# Patient Record
(demographics unavailable — no encounter records)

---

## 2024-10-29 NOTE — DISCUSSION/SUMMARY
[FreeTextEntry1] : aggressive bp control.  refractory hypertension  continue coreg 25 q12/losartan 100/amlodipine 10 mg  hr 48 2023 and ? tired possible etiology as well continue coreg but possibly can change if severe symptoms in future.  continue hydralazine to 100 mg po tid.  continue atorvastatin 40 mg po qhs  JARDIANCE TOO EXPENSIVE. 2023  carotid: now LICA 70% as well, velocities similar: f/u dr. Delong  f/u in 4 months, bloodwork  pulmonary htn probably due to DHF asymptomatic with elevated pressures,  start spironolactone 50 mg po daily had dehydration in past so careful and call if symptoms. [EKG obtained to assist in diagnosis and management of assessed problem(s)] : EKG obtained to assist in diagnosis and management of assessed problem(s)

## 2024-10-29 NOTE — CARDIOLOGY SUMMARY
[de-identified] : sinus bradycardia cwith nsst changes at 48 bpm  [de-identified] : 3/20/23: \par  Severe stenosis of the bilateral proximal internal carotid arteries due to atherosclerotic calcification. 70% B/L  [de-identified] : 2/27/23\par  1. Right: proximal ICA >70% stenosis.\par  2. Left: proximal ICA 50-69% stenosis.\par  3. Subclavian arteries: no significant atherosclerosis bilaterally.\par  4. Vertebral flow is antigrade bilaterally.\par  5. Moderate atherosclerosis b/l carotids.

## 2024-10-29 NOTE — ASSESSMENT
[FreeTextEntry1] : 72 /o M with h/o HTN, SYNCOPE, DHF, B/L CAROTID STENOSIS 70% B/L ON CTA presents today for initial evaluation. Denies neurologic Sx. \par  \par  \par  3/24/23: \par  pt with b/l ica of 70% \par  CT: Severe stenosis of the bilateral proximal internal carotid arteries due to atherosclerotic calcification. (70%)\par  1/11/23: ldl 56 AND hdl 65  k; 5.1  gfr: 91  bnp: 135 \par  pt denies any cv complaints and denies sob though elevated pressures, pt walks 3 times a week for 2 miles. \par  \par  Plan:\par  - Continue monitoring carotid disease\par  - Aggressive lipid control / risk factor mod.

## 2024-10-29 NOTE — CARDIOLOGY SUMMARY
[de-identified] : sinus bradycardia cwith nsst changes at 48 bpm  [de-identified] : 3/20/23: \par  Severe stenosis of the bilateral proximal internal carotid arteries due to atherosclerotic calcification. 70% B/L  [de-identified] : 2/27/23\par  1. Right: proximal ICA >70% stenosis.\par  2. Left: proximal ICA 50-69% stenosis.\par  3. Subclavian arteries: no significant atherosclerosis bilaterally.\par  4. Vertebral flow is antigrade bilaterally.\par  5. Moderate atherosclerosis b/l carotids.

## 2024-10-29 NOTE — HISTORY OF PRESENT ILLNESS
[FreeTextEntry1] : pt with  HTN, SYNCOPE, DHF, B/L CAROTID STENOSIS 70% B/L ON CTA f/u with DR. ALICIA.  DD2, LVH, HIGH CO, BRANDON, Right mild JOSEFINA, Pulmonary HTN.   CT: Severe stenosis of the bilateral proximal internal carotid arteries due to atherosclerotic calcification. 23: ldl 56 AND hdl 65  k; 5.1  gfr: 91  bnp: 135    23:  70% b/l carotid sees dr. alicia And will f/u with ultrasound for now.  Patient denies cp and sob. Patient walks 2 miles 3 days a week. Patient denies dizziness or leg pain. Patient stopped taking chlorthalidone 2 months ago due to urinating too much.  T. HDL: 73, LDL: 55, APOB: 49, LPA: 23  23: pt had hydralazine started for elevated bp of 160/70 and chlorthalidone stopped.  pt bp still elevated and not sure if loss of appetite and tiredness at night is from medication or not.  pt has no other complaints.   23: Patient states he is having issues with Hydralazine 50mg such as loss of appetite and fatigue, patient educated to reduce to hydralazine 25mg at this time.  STOP LOSARTAN START VALSARTN 320, GET BW, AND RENAL U/S.   23: HGB: 12.1, T, HDL: 70, LDL: 46, BNP: 158 :  Echo lvef 65%, Dd2, E/e': 14, LVOT VTI: 38 cm LVMI:121 g/m2  Patient denies cp, sob, dizziness, or syncope. Patient c/o palpitations he describes as heart racing. Patient states they happen at night while he is lying in bed. Patient states palpitations happen about once per week and last for a couple of minutes.   10/6/23: PATIENT STATES JARDIANCE IS TOO EXPENSIVE $240/MONTH.   24: Patient started on Jardiance last visit BUT WAS TOO EXPENSIVE SO DID NOT START.  23: Less than 60% right renal artery stenosis. Left renal artery: normal.  bloodwork not done.  pt has bad appetite intermittently, pt denies cp, pt with intermittent sob if exerts himself too much otherwise no isssues.   6/24/24: 3/15/24: LDL: 44, T, HDL: 72, BNP: 180 bun/cr: 16/0.96 gfr: 83  24: Right: proximal ICA >70% stenosis. Left: proximal ICA >70% stenosis. Vertebral arteries: antegrade flow bilaterally. Subclavian arteries: no significant atherosclerosis bilaterally. Compared to the carotid ultrasound performed on 2023, Left ICA is >70% stenosis compared to 60% in past. Moderate calcified atherosclerosis b/l bifurcation. pt able to walk 2 miles without leg pain 3 times/week.  appreciate dr. alicia note getting CTA abdomen/pelvis to r/o renal artery stenosis.   10/11/24: 24: BNP: 194, HDL: 71, T, LDL :39 on atorvastatin 40 mg po qhs  : Echo: lvef 64%, LVMI: 143 g/m2, TR sosa: 3.75 m/s, GLS: -25.4%, E/e': 13, L wave on mitral valve inflow. lvot vti: 37.6 cm, CO: 8.9 l/min, PASP: 64 mmHg. severe LAE, mod NERIS, PASP: 64 mmHg, mod LVH increasing, Ao: 3.9 cm.  pt says feels well, walking 3 times/week 2 miles with no problems.  EKG: nsr with LVH.  bp mildly elevated.

## 2024-10-29 NOTE — PHYSICAL EXAM
[Carotid Bruit] : carotid bruit [Normal S1, S2] : normal S1, S2 [No Murmur] : no murmur [Clear Lung Fields] : clear lung fields [General Appearance - Well Developed] : well developed [Normal Conjunctiva] : the conjunctiva exhibited no abnormalities [Normal Oral Mucosa] : normal oral mucosa [Normal Jugular Venous V Waves Present] : normal jugular venous V waves present [] : no respiratory distress [Heart Rate And Rhythm] : heart rate and rhythm were normal [Heart Sounds] : normal S1 and S2 [Bowel Sounds] : normal bowel sounds [Abnormal Walk] : normal gait [Nail Clubbing] : no clubbing of the fingernails [Skin Color & Pigmentation] : normal skin color and pigmentation [Oriented To Time, Place, And Person] : oriented to person, place, and time [de-identified] : b/l

## 2024-10-29 NOTE — PHYSICAL EXAM
[Carotid Bruit] : carotid bruit [Normal S1, S2] : normal S1, S2 [No Murmur] : no murmur [Clear Lung Fields] : clear lung fields [General Appearance - Well Developed] : well developed [Normal Conjunctiva] : the conjunctiva exhibited no abnormalities [Normal Oral Mucosa] : normal oral mucosa [Normal Jugular Venous V Waves Present] : normal jugular venous V waves present [] : no respiratory distress [Heart Rate And Rhythm] : heart rate and rhythm were normal [Heart Sounds] : normal S1 and S2 [Bowel Sounds] : normal bowel sounds [Abnormal Walk] : normal gait [Nail Clubbing] : no clubbing of the fingernails [Skin Color & Pigmentation] : normal skin color and pigmentation [Oriented To Time, Place, And Person] : oriented to person, place, and time [de-identified] : b/l

## 2024-11-13 NOTE — PHYSICAL EXAM
[General Appearance - Well Developed] : well developed [Normal Appearance] : normal appearance [General Appearance - Well Nourished] : well nourished [Respiration, Rhythm And Depth] : normal respiratory rhythm and effort [Auscultation Breath Sounds / Voice Sounds] : lungs were clear to auscultation bilaterally [Chest Palpation] : palpation of the chest revealed no abnormalities [Lungs Percussion] : the lungs were normal to percussion [Heart Sounds] : normal S1 and S2 [Arterial Pulses Normal] : the arterial pulses were normal [Abdomen Tenderness] : non-tender [Abdomen Hernia] : no hernia was discovered [Abnormal Walk] : normal gait [Skin Turgor] : normal skin turgor [] : no rash [Oriented To Time, Place, And Person] : oriented to person, place, and time [Impaired Insight] : insight and judgment were intact [Affect] : the affect was normal [Memory Recent] : recent memory was not impaired

## 2024-11-13 NOTE — REVIEW OF SYSTEMS
[Fever] : no fever [Headache] : no headache [Chills] : no chills [Blurry Vision] : no blurred vision [Earache] : no earache [Discharge From Ears] : no discharge from the ears [Sore Throat] : no sore throat [SOB] : no shortness of breath [Dyspnea on exertion] : not dyspnea during exertion [Leg Claudication] : no intermittent leg claudication [Palpitations] : no palpitations [Cough] : no cough [Wheezing] : no wheezing [Abdominal Pain] : no abdominal pain [Nausea] : no nausea [Change in Appetite] : no change in appetite [Change In The Stool] : no change in stool [Constipation] : no constipation [Urinary Frequency] : no change in urinary frequency [Erectile Dysfunction] : no erectile dysfunction [Joint Pain] : no joint pain [Dizziness] : no dizziness [Convulsions] : no convulsions [Confusion] : no confusion was observed [Memory Lapses Or Loss] : no memory lapses or loss [Under Stress] : not under stress

## 2024-11-13 NOTE — HISTORY OF PRESENT ILLNESS
[FreeTextEntry1] : 72 YO M with  HTN, well controlled today on 3 meds.  Syncope, no repeated episodes.  HFpEF, Asymptomatic. Stable.  DLD-LDL 46.  B/L CAROTID STENOSIS (70% B/L ON CTA) presents for a routine follow up.  States he is able to walk 2 miles without any LE pain. No complaints today.  The patient denies CP, bleeding, SOB at rest, PND, abdominal discomfort, LH/dizziness, BLE edema, palpitations, and syncope.  ===DATA REVIEWED TODAY===   Renal artery duplex: Difficult study with no clear visualization of renal arteries   Carotid duplex: CHAVEZ 294/38 LICA 247/34  23: HGB: 12.1, T, HDL: 70, LDL: 46, BNP: 158 : Echo lvef 65%, GIIDD, E/e': 14, LVOT VTI: 38 cm LVMI:121 g/m2  ===OLD DATA=== 3/24/23: pt with b/l ica of 70% CT: Severe stenosis of the bilateral proximal internal carotid arteries due to atherosclerotic calcification. 24: T, HDL: 71, LDL: 39, BNP: 194,  24: Bilateral renal artery ostial calcification but no significant luminal stenosis. Cholelithiasis.

## 2024-11-13 NOTE — HISTORY OF PRESENT ILLNESS
[FreeTextEntry1] : 74 YO M with  HTN, well controlled today on 3 meds.  Syncope, no repeated episodes.  HFpEF, Asymptomatic. Stable.  DLD-LDL 46.  B/L CAROTID STENOSIS (70% B/L ON CTA) presents for a routine follow up.  States he is able to walk 2 miles without any LE pain. No complaints today.  The patient denies CP, bleeding, SOB at rest, PND, abdominal discomfort, LH/dizziness, BLE edema, palpitations, and syncope.  ===DATA REVIEWED TODAY===   Renal artery duplex: Difficult study with no clear visualization of renal arteries   Carotid duplex: CHAVEZ 294/38 LICA 247/34  23: HGB: 12.1, T, HDL: 70, LDL: 46, BNP: 158 : Echo lvef 65%, GIIDD, E/e': 14, LVOT VTI: 38 cm LVMI:121 g/m2  ===OLD DATA=== 3/24/23: pt with b/l ica of 70% CT: Severe stenosis of the bilateral proximal internal carotid arteries due to atherosclerotic calcification. 24: T, HDL: 71, LDL: 39, BNP: 194,  24: Bilateral renal artery ostial calcification but no significant luminal stenosis. Cholelithiasis.

## 2024-11-13 NOTE — ASSESSMENT
[FreeTextEntry1] : 74 YO M with  HTN, well controlled today on 3 meds. Still evaluating presence of renovascular disease given med requirements.  Syncope, no repeated episodes.  HFpEF, Asymptomatic. Stable.  DLD-LDL 46.  B/L CAROTID STENOSIS (70% B/L ON CTA)  States he is able to walk 2 miles without any LE pain. No complaints today.   Plan  - Rule out secondary causes of HTN: MORENO send for sleep study - Get secondary HTN labs: renin/aldosterone - Start HCTZ  - Chest X-ray PA/LAT - See Dr. Herrmann within next 2 weeks  - Send to Dr. Espinal after Dr. Herrmann for renal denervation then will f/u with Dr. Delong  - Continue Lipitor 40 daily

## 2024-11-26 NOTE — HISTORY OF PRESENT ILLNESS
[FreeTextEntry1] : pt with  HTN, SYNCOPE, DHF, B/L CAROTID STENOSIS 70% B/L ON CTA f/u with DR. ALICIA.  DD2, LVH, HIGH CO, BRANDON, Right mild JOSEFINA, Pulmonary HTN.   CT: Severe stenosis of the bilateral proximal internal carotid arteries due to atherosclerotic calcification. 23: ldl 56 AND hdl 65  k; 5.1  gfr: 91  bnp: 135    23:  70% b/l carotid sees dr. alicia And will f/u with ultrasound for now.  Patient denies cp and sob. Patient walks 2 miles 3 days a week. Patient denies dizziness or leg pain. Patient stopped taking chlorthalidone 2 months ago due to urinating too much.  T. HDL: 73, LDL: 55, APOB: 49, LPA: 23  23: pt had hydralazine started for elevated bp of 160/70 and chlorthalidone stopped.  pt bp still elevated and not sure if loss of appetite and tiredness at night is from medication or not.  pt has no other complaints.   23: Patient states he is having issues with Hydralazine 50mg such as loss of appetite and fatigue, patient educated to reduce to hydralazine 25mg at this time.  STOP LOSARTAN START VALSARTAN 320, GET BW, AND RENAL U/S.   23: HGB: 12.1, T, HDL: 70, LDL: 46, BNP: 158 :  Echo lvef 65%, Dd2, E/e': 14, LVOT VTI: 38 cm LVMI:121 g/m2  Patient denies cp, sob, dizziness, or syncope. Patient c/o palpitations he describes as heart racing. Patient states they happen at night while he is lying in bed. Patient states palpitations happen about once per week and last for a couple of minutes.   10/6/23: PATIENT STATES JARDIANCE IS TOO EXPENSIVE $240/MONTH.   24: Patient started on Jardiance last visit BUT WAS TOO EXPENSIVE SO DID NOT START.  23: Less than 60% right renal artery stenosis. Left renal artery: normal.  bloodwork not done.  pt has bad appetite intermittently, pt denies cp, pt with intermittent sob if exerts himself too much otherwise no issues.   24: 3/15/24: LDL: 44, T, HDL: 72, BNP: 180 bun/cr: 16/0.96 gfr: 83  24: Right: proximal ICA >70% stenosis. Left: proximal ICA >70% stenosis. Vertebral arteries: antegrade flow bilaterally. Subclavian arteries: no significant atherosclerosis bilaterally. Compared to the carotid ultrasound performed on 2023, Left ICA is >70% stenosis compared to 60% in past. Moderate calcified atherosclerosis b/l bifurcation. pt able to walk 2 miles without leg pain 3 times/week.  appreciate dr. alicia note getting CTA abdomen/pelvis to r/o renal artery stenosis.   10/11/24: 24: BNP: 194, HDL: 71, T, LDL :39 on atorvastatin 40 mg po qhs  : Echo: lvef 64%, LVMI: 143 g/m2, TR sosa: 3.75 m/s, GLS: -25.4%, E/e': 13, L wave on mitral valve inflow. lvot vti: 37.6 cm, CO: 8.9 l/min, PASP: 64 mmHg. severe LAE, mod NERIS, PASP: 64 mmHg, mod LVH increasing, Ao: 3.9 cm.  pt says feels well, walking 3 times/week 2 miles with no problems.  EKG: nsr with LVH.  bp mildly elevated.   24:  BNP: 194, HDL: 68, T, LDL: 40  Renin/Aldosterone.  appreciate dr. alicia note: pt with htn worsening, has mild renal artery stenosis, will send to [facility] for renal denervation trial.  hctz started for bp 168/70  pt does not see dr. alicia for now. pt taking hctz now and feels no issues and bp now controlled.

## 2024-11-26 NOTE — PHYSICAL EXAM
[Carotid Bruit] : carotid bruit [Normal S1, S2] : normal S1, S2 [No Murmur] : no murmur [Clear Lung Fields] : clear lung fields [General Appearance - Well Developed] : well developed [Normal Conjunctiva] : the conjunctiva exhibited no abnormalities [Normal Oral Mucosa] : normal oral mucosa [Normal Jugular Venous V Waves Present] : normal jugular venous V waves present [] : no respiratory distress [Heart Rate And Rhythm] : heart rate and rhythm were normal [Heart Sounds] : normal S1 and S2 [Bowel Sounds] : normal bowel sounds [Abnormal Walk] : normal gait [Nail Clubbing] : no clubbing of the fingernails [Skin Color & Pigmentation] : normal skin color and pigmentation [Oriented To Time, Place, And Person] : oriented to person, place, and time [de-identified] : b/l

## 2024-11-26 NOTE — HISTORY OF PRESENT ILLNESS
[FreeTextEntry1] : pt with  HTN, SYNCOPE, DHF, B/L CAROTID STENOSIS 70% B/L ON CTA f/u with DR. ALICIA.  DD2, LVH, HIGH CO, BRANDON, Right mild JOSEFINA, Pulmonary HTN.   CT: Severe stenosis of the bilateral proximal internal carotid arteries due to atherosclerotic calcification. 23: ldl 56 AND hdl 65  k; 5.1  gfr: 91  bnp: 135    23:  70% b/l carotid sees dr. alicia And will f/u with ultrasound for now.  Patient denies cp and sob. Patient walks 2 miles 3 days a week. Patient denies dizziness or leg pain. Patient stopped taking chlorthalidone 2 months ago due to urinating too much.  T. HDL: 73, LDL: 55, APOB: 49, LPA: 23  23: pt had hydralazine started for elevated bp of 160/70 and chlorthalidone stopped.  pt bp still elevated and not sure if loss of appetite and tiredness at night is from medication or not.  pt has no other complaints.   23: Patient states he is having issues with Hydralazine 50mg such as loss of appetite and fatigue, patient educated to reduce to hydralazine 25mg at this time.  STOP LOSARTAN START VALSARTAN 320, GET BW, AND RENAL U/S.   23: HGB: 12.1, T, HDL: 70, LDL: 46, BNP: 158 :  Echo lvef 65%, Dd2, E/e': 14, LVOT VTI: 38 cm LVMI:121 g/m2  Patient denies cp, sob, dizziness, or syncope. Patient c/o palpitations he describes as heart racing. Patient states they happen at night while he is lying in bed. Patient states palpitations happen about once per week and last for a couple of minutes.   10/6/23: PATIENT STATES JARDIANCE IS TOO EXPENSIVE $240/MONTH.   24: Patient started on Jardiance last visit BUT WAS TOO EXPENSIVE SO DID NOT START.  23: Less than 60% right renal artery stenosis. Left renal artery: normal.  bloodwork not done.  pt has bad appetite intermittently, pt denies cp, pt with intermittent sob if exerts himself too much otherwise no issues.   24: 3/15/24: LDL: 44, T, HDL: 72, BNP: 180 bun/cr: 16/0.96 gfr: 83  24: Right: proximal ICA >70% stenosis. Left: proximal ICA >70% stenosis. Vertebral arteries: antegrade flow bilaterally. Subclavian arteries: no significant atherosclerosis bilaterally. Compared to the carotid ultrasound performed on 2023, Left ICA is >70% stenosis compared to 60% in past. Moderate calcified atherosclerosis b/l bifurcation. pt able to walk 2 miles without leg pain 3 times/week.  appreciate dr. alicia note getting CTA abdomen/pelvis to r/o renal artery stenosis.   10/11/24: 24: BNP: 194, HDL: 71, T, LDL :39 on atorvastatin 40 mg po qhs  : Echo: lvef 64%, LVMI: 143 g/m2, TR sosa: 3.75 m/s, GLS: -25.4%, E/e': 13, L wave on mitral valve inflow. lvot vti: 37.6 cm, CO: 8.9 l/min, PASP: 64 mmHg. severe LAE, mod NERIS, PASP: 64 mmHg, mod LVH increasing, Ao: 3.9 cm.  pt says feels well, walking 3 times/week 2 miles with no problems.  EKG: nsr with LVH.  bp mildly elevated.   24:  BNP: 194, HDL: 68, T, LDL: 40  Renin/Aldosterone.  appreciate dr. alicia note: pt with htn worsening, has mild renal artery stenosis, will send to [facility] for renal denervation trial.  hctz started for bp 168/70  pt does not see dr. alicia for now. pt taking hctz now and feels no issues and bp now controlled.

## 2024-11-26 NOTE — CARDIOLOGY SUMMARY
[de-identified] : sinus bradycardia cwith nsst changes at 48 bpm  [de-identified] : 3/20/23: \par  Severe stenosis of the bilateral proximal internal carotid arteries due to atherosclerotic calcification. 70% B/L  [de-identified] : 2/27/23\par  1. Right: proximal ICA >70% stenosis.\par  2. Left: proximal ICA 50-69% stenosis.\par  3. Subclavian arteries: no significant atherosclerosis bilaterally.\par  4. Vertebral flow is antigrade bilaterally.\par  5. Moderate atherosclerosis b/l carotids.

## 2024-11-26 NOTE — DISCUSSION/SUMMARY
[FreeTextEntry1] : aggressive bp control.  refractory hypertension  continue coreg 25 q12/losartan 100/amlodipine 10 mg  hr 48 2023 and ? tired possible etiology as well continue coreg but possibly can change if severe symptoms in future.  continue hydralazine to 100 mg po tid.  continue atorvastatin 40 mg po qhs  JARDIANCE TOO EXPENSIVE. 2023  carotid: now LICA 70% as well, velocities similar: f/u dr. Delong  pulmonary htn probably due to DHF asymptomatic with elevated pressures,  continue spironolactone 50 mg po daily had dehydration in past so careful and call if symptoms. continue hctz for now  watch for dehydration.  f/u in 4 months.labs, send to dr. haile for renal denervation trial. pt agrees.

## 2024-11-26 NOTE — CARDIOLOGY SUMMARY
[de-identified] : sinus bradycardia cwith nsst changes at 48 bpm  [de-identified] : 3/20/23: \par  Severe stenosis of the bilateral proximal internal carotid arteries due to atherosclerotic calcification. 70% B/L  [de-identified] : 2/27/23\par  1. Right: proximal ICA >70% stenosis.\par  2. Left: proximal ICA 50-69% stenosis.\par  3. Subclavian arteries: no significant atherosclerosis bilaterally.\par  4. Vertebral flow is antigrade bilaterally.\par  5. Moderate atherosclerosis b/l carotids.

## 2024-11-26 NOTE — PHYSICAL EXAM
[Carotid Bruit] : carotid bruit [Normal S1, S2] : normal S1, S2 [No Murmur] : no murmur [Clear Lung Fields] : clear lung fields [General Appearance - Well Developed] : well developed [Normal Conjunctiva] : the conjunctiva exhibited no abnormalities [Normal Oral Mucosa] : normal oral mucosa [Normal Jugular Venous V Waves Present] : normal jugular venous V waves present [] : no respiratory distress [Heart Rate And Rhythm] : heart rate and rhythm were normal [Heart Sounds] : normal S1 and S2 [Bowel Sounds] : normal bowel sounds [Abnormal Walk] : normal gait [Nail Clubbing] : no clubbing of the fingernails [Skin Color & Pigmentation] : normal skin color and pigmentation [Oriented To Time, Place, And Person] : oriented to person, place, and time [de-identified] : b/l

## 2025-03-05 NOTE — HISTORY OF PRESENT ILLNESS
[FreeTextEntry1] :  Subjective:   - Summary: Mr. Pressley presents for follow-up treatment of thickened, painful, elongated, mycotic, dystrophic onychomycotic toenails with no change in medical history, meds, allergies, or social history.   - Chief Complaint (CC): Painful, thickened and elongated toenails.   - History of Present Illness (HPI): Patient has been having ongoing issues with all ten toenails including mycotic, dystrophic onychomycotic changes.   - Past Medical History: No changes reported.   Objective:      - Vital Signs: DP is 1 over 4 bilateral. PT is 1 over 4 bilateral. Temperature gradients within normal limits.   - Physical Examination (PE): Upon Physical inspection, the toenails are elongated and thick. They exhibit signs of mycotic, dystrophic, and onychomycotic changes. Cap return is observed at two seconds times 10.   Assessment:   - Summary: After examining Mr. Pressley's symptoms and physical inspection results, I diagnosed three problems: onychomycosis, onychoryphosis, and onychodystrophy.   - Problems:     - Onychomycosis     - Onychogryphosis     - Onychodystrophy      Plan:   - Summary: The plan includes an examination, mechanical debridement of all toenails, and a follow-up visit in a month's time.   - Plan:     - Examination     - Mechanical debridement of all ten painful, thickened, onychomycotic, onychogryphotic toenails times ten.     - Follow-up in office in two months.

## 2025-03-24 NOTE — DISCUSSION/SUMMARY
[FreeTextEntry1] : aggressive bp control.  refractory hypertension  continue coreg 25 q12/losartan 100/amlodipine 10 mg  hr 48 2023 and ? tired possible etiology as well continue coreg but possibly can change if severe symptoms in future.  continue hydralazine to 100 mg po tid.  continue atorvastatin 40 mg po qhs  JARDIANCE TOO EXPENSIVE. 2023  carotid: now LICA 70% as well, velocities similar: f/u dr. Delong  pulmonary htn probably due to DHF asymptomatic with elevated pressures,  continue spironolactone 50 mg po daily had dehydration in past so careful and call if symptoms. increase hctz from 12.5 to 25mg watch for dehydration patient educated on importance of hydration  send to dr. haile for renal denervation trial blood work/ carotid/ 4 months

## 2025-03-24 NOTE — REVIEW OF SYSTEMS
----- Message from Pura Ramirez sent at 12/27/2022  2:28 PM CST -----  Type:  Patient Returning Call    Who Called: pt   Who Left Message for Patient: pt   Does the patient know what this is regarding?: pt have an appt 01/13/2023 and need lab orders put in   Would the patient rather a call back or a response via [facility]?  Call   Best Call Back Number:[phone]  Additional Information:   lab orders         [Negative] : Respiratory

## 2025-03-24 NOTE — PHYSICAL EXAM
[Carotid Bruit] : carotid bruit [Normal S1, S2] : normal S1, S2 [No Murmur] : no murmur [Clear Lung Fields] : clear lung fields [General Appearance - Well Developed] : well developed [Normal Conjunctiva] : the conjunctiva exhibited no abnormalities [Normal Oral Mucosa] : normal oral mucosa [Normal Jugular Venous V Waves Present] : normal jugular venous V waves present [] : no respiratory distress [Heart Rate And Rhythm] : heart rate and rhythm were normal [Heart Sounds] : normal S1 and S2 [Bowel Sounds] : normal bowel sounds [Abnormal Walk] : normal gait [Nail Clubbing] : no clubbing of the fingernails [Skin Color & Pigmentation] : normal skin color and pigmentation [Oriented To Time, Place, And Person] : oriented to person, place, and time [de-identified] : b/l

## 2025-03-24 NOTE — HISTORY OF PRESENT ILLNESS
[FreeTextEntry1] : pt with  HTN, SYNCOPE, DHF, B/L CAROTID STENOSIS 70% B/L ON CTA f/u with DR. DELONG.  DD2, LVH, HIGH CO, BRANDON, Right mild JOSEFINA, Pulmonary HTN.   CT: Severe stenosis of the bilateral proximal internal carotid arteries due to atherosclerotic calcification. 23: ldl 56 AND hdl 65  k; 5.1  gfr: 91  bnp: 135    23:  70% b/l carotid sees dr. delong And will f/u with ultrasound for now.  Patient denies cp and sob. Patient walks 2 miles 3 days a week. Patient denies dizziness or leg pain. Patient stopped taking chlorthalidone 2 months ago due to urinating too much.  T. HDL: 73, LDL: 55, APOB: 49, LPA: 23  23: pt had hydralazine started for elevated bp of 160/70 and chlorthalidone stopped.  pt bp still elevated and not sure if loss of appetite and tiredness at night is from medication or not.  pt has no other complaints.   23: Patient states he is having issues with Hydralazine 50mg such as loss of appetite and fatigue, patient educated to reduce to hydralazine 25mg at this time.  STOP LOSARTAN START VALSARTAN 320, GET BW, AND RENAL U/S.   23: HGB: 12.1, T, HDL: 70, LDL: 46, BNP: 158 :  Echo lvef 65%, Dd2, E/e': 14, LVOT VTI: 38 cm LVMI:121 g/m2  Patient denies cp, sob, dizziness, or syncope. Patient c/o palpitations he describes as heart racing. Patient states they happen at night while he is lying in bed. Patient states palpitations happen about once per week and last for a couple of minutes.   10/6/23: PATIENT STATES JARDIANCE IS TOO EXPENSIVE $240/MONTH.   24: Patient started on Jardiance last visit BUT WAS TOO EXPENSIVE SO DID NOT START.  23: Less than 60% right renal artery stenosis. Left renal artery: normal.  bloodwork not done.  pt has bad appetite intermittently, pt denies cp, pt with intermittent sob if exerts himself too much otherwise no issues.   24: 3/15/24: LDL: 44, T, HDL: 72, BNP: 180 bun/cr: 16/0.96 gfr: 83  24: Right: proximal ICA >70% stenosis. Left: proximal ICA >70% stenosis. Vertebral arteries: antegrade flow bilaterally. Subclavian arteries: no significant atherosclerosis bilaterally. Compared to the carotid ultrasound performed on 2023, Left ICA is >70% stenosis compared to 60% in past. Moderate calcified atherosclerosis b/l bifurcation. pt able to walk 2 miles without leg pain 3 times/week.  appreciate dr. delong note getting CTA abdomen/pelvis to r/o renal artery stenosis.   10/11/24: 24: BNP: 194, HDL: 71, T, LDL :39 on atorvastatin 40 mg po qhs  : Echo: lvef 64%, LVMI: 143 g/m2, TR sosa: 3.75 m/s, GLS: -25.4%, E/e': 13, L wave on mitral valve inflow. lvot vti: 37.6 cm, CO: 8.9 l/min, PASP: 64 mmHg. severe LAE, mod NERIS, PASP: 64 mmHg, mod LVH increasing, Ao: 3.9 cm.  pt says feels well, walking 3 times/week 2 miles with no problems.  EKG: nsr with LVH.  bp mildly elevated.   24:  BNP: 194, HDL: 68, T, LDL: 40  Renin/Aldosterone.  appreciate dr. delong note: pt with htn worsening, has mild renal artery stenosis, will send to [facility] for renal denervation trial.  hctz started for bp 168/70  pt does not see dr. Delong for now. pt taking hctz now and feels no issues and bp now controlled.   3/25/25: 25: T, HDL: 62, LDL: 42, BNP: 178 NSH on 3/12 with [facility] patient was unable to make appointment and needs to r/s Patient walks 3 days weekly.  The patient denies CP, bleeding, SOB at rest, PND, abdominal discomfort, LH/dizziness, BLE edema, palpitations, and syncope.

## 2025-03-24 NOTE — CARDIOLOGY SUMMARY
[de-identified] : sinus bradycardia cwith nsst changes at 48 bpm  [de-identified] : 3/20/23: \par  Severe stenosis of the bilateral proximal internal carotid arteries due to atherosclerotic calcification. 70% B/L  [de-identified] : 2/27/23\par  1. Right: proximal ICA >70% stenosis.\par  2. Left: proximal ICA 50-69% stenosis.\par  3. Subclavian arteries: no significant atherosclerosis bilaterally.\par  4. Vertebral flow is antigrade bilaterally.\par  5. Moderate atherosclerosis b/l carotids.

## 2025-06-18 NOTE — HISTORY OF PRESENT ILLNESS
[FreeTextEntry1] :  Subjective:   - Summary: Patient presents for follow-up care of bilateral foot pain and multiple nail conditions.   - Chief Complaint (CC): Generalized bilateral foot pain and thickened, elongated toenails.   - History of Present Illness (HPI): Patient reports ongoing issues with thickened, elongated, painful toenails described as onychomycotic, onychogryphotic, painful,  and dystrophic, affecting all 10 toes. The patient also complains of generalized bilateral foot pain.   Objective:    - Vital Signs:   - Physical Examination (PE): Dorsalis Pedis (DP) pulse: 2/4 bilateral; TG: WNL B/L Posterior Tibial (PT) pulse: 1/4 bilateral Capillary refill: 2 seconds bilaterally for all 10 toes Toenails: There are thickened, elongated, painful, onychomycotic, onychogryphotic, dystrophic toenails times ten. Bilateral foot pain noted.   Assessment:   - Summary: The patient presents with chronic bilateral foot pain and significant toenail pathology affecting all digits.   - Problems:     - Onychomycosis     - Onychogryphosis     - Bilateral foot pain     Plan:   - Summary: The treatment plan focuses on addressing the patient's toenail conditions and managing foot pain.   - Plan: -Exam.     - Perform aseptic mechanical debridement of the elongated, painful, onychomycotic, onychogryphotic, dystrophic toenails times 10.     - Provide patient education on proper foot care and nail hygiene/     - Recommend appropriate footwear to alleviate foot pain/     - Schedule follow-up appointment in two months to assess progress and perform further treatment if necessary in two months.

## 2025-07-28 NOTE — HISTORY OF PRESENT ILLNESS
[FreeTextEntry1] : pt with  HTN, SYNCOPE, DHF, B/L CAROTID STENOSIS 70% B/L ON CTA f/u with DR. DELONG.  DD2, LVH, HIGH CO, BRANDON, Right mild JOSEFINA, Pulmonary HTN.   CT: Severe stenosis of the bilateral proximal internal carotid arteries due to atherosclerotic calcification. 23: ldl 56 AND hdl 65  k; 5.1  gfr: 91  bnp: 135    23:  70% b/l carotid sees dr. delong And will f/u with ultrasound for now.  Patient denies cp and sob. Patient walks 2 miles 3 days a week. Patient denies dizziness or leg pain. Patient stopped taking chlorthalidone 2 months ago due to urinating too much.  T. HDL: 73, LDL: 55, APOB: 49, LPA: 23  23: pt had hydralazine started for elevated bp of 160/70 and chlorthalidone stopped.  pt bp still elevated and not sure if loss of appetite and tiredness at night is from medication or not.  pt has no other complaints.   23: Patient states he is having issues with Hydralazine 50mg such as loss of appetite and fatigue, patient educated to reduce to hydralazine 25mg at this time.  STOP LOSARTAN START VALSARTAN 320, GET BW, AND RENAL U/S.   23: HGB: 12.1, T, HDL: 70, LDL: 46, BNP: 158 :  Echo lvef 65%, Dd2, E/e': 14, LVOT VTI: 38 cm LVMI:121 g/m2  Patient denies cp, sob, dizziness, or syncope. Patient c/o palpitations he describes as heart racing. Patient states they happen at night while he is lying in bed. Patient states palpitations happen about once per week and last for a couple of minutes.   10/6/23: PATIENT STATES JARDIANCE IS TOO EXPENSIVE $240/MONTH.   24: Patient started on Jardiance last visit BUT WAS TOO EXPENSIVE SO DID NOT START.  23: Less than 60% right renal artery stenosis. Left renal artery: normal.  bloodwork not done.  pt has bad appetite intermittently, pt denies cp, pt with intermittent sob if exerts himself too much otherwise no issues.   24: 3/15/24: LDL: 44, T, HDL: 72, BNP: 180 bun/cr: 16/0.96 gfr: 83  24: Right: proximal ICA >70% stenosis. Left: proximal ICA >70% stenosis. Vertebral arteries: antegrade flow bilaterally. Subclavian arteries: no significant atherosclerosis bilaterally. Compared to the carotid ultrasound performed on 2023, Left ICA is >70% stenosis compared to 60% in past. Moderate calcified atherosclerosis b/l bifurcation. pt able to walk 2 miles without leg pain 3 times/week.  appreciate dr. delong note getting CTA abdomen/pelvis to r/o renal artery stenosis.   10/11/24: 24: BNP: 194, HDL: 71, T, LDL :39 on atorvastatin 40 mg po qhs  : Echo: lvef 64%, LVMI: 143 g/m2, TR sosa: 3.75 m/s, GLS: -25.4%, E/e': 13, L wave on mitral valve inflow. lvot vti: 37.6 cm, CO: 8.9 l/min, PASP: 64 mmHg. severe LAE, mod NERIS, PASP: 64 mmHg, mod LVH increasing, Ao: 3.9 cm.  pt says feels well, walking 3 times/week 2 miles with no problems.  EKG: nsr with LVH.  bp mildly elevated.   24:  BNP: 194, HDL: 68, T, LDL: 40  Renin/Aldosterone.  appreciate dr. delong note: pt with htn worsening, has mild renal artery stenosis, will send to [facility] for renal denervation trial.  hctz started for bp 168/70  pt does not see dr. Delong for now. pt taking hctz now and feels no issues and bp now controlled.   3/25/25: 25: T, HDL: 62, LDL: 42, BNP: 178 NSH on 3/12 with [facility] patient was unable to make appointment and needs to r/s Patient walks 3 days weekly.  The patient denies CP, bleeding, SOB at rest, PND, abdominal discomfort, LH/dizziness, BLE edema, palpitations, and syncope.   25: 25: H/H: 13/40, BNP: 214, HDL: 72, T, LDL: 37 25: Carotid: Right: proximal ICA >70% stenosis. Left: proximal ICA >70% stenosis. Vertebral arteries: antegrade flow bilaterally. Subclavian arteries: no significant atherosclerosis bilaterally. Compared to the carotid ultrasound performed on 2024, there is no significant change. Moderate irregular calcified and non-calcified atherosclosis b/l ICA bifurcation. pt with walking 3 days a week and no cp or sob. pt drinking plenty of water.

## 2025-07-28 NOTE — PHYSICAL EXAM
[Carotid Bruit] : carotid bruit [Normal S1, S2] : normal S1, S2 [No Murmur] : no murmur [Clear Lung Fields] : clear lung fields [General Appearance - Well Developed] : well developed [Normal Conjunctiva] : the conjunctiva exhibited no abnormalities [Normal Oral Mucosa] : normal oral mucosa [Normal Jugular Venous V Waves Present] : normal jugular venous V waves present [] : no respiratory distress [Heart Rate And Rhythm] : heart rate and rhythm were normal [Heart Sounds] : normal S1 and S2 [Bowel Sounds] : normal bowel sounds [Abnormal Walk] : normal gait [Nail Clubbing] : no clubbing of the fingernails [Skin Color & Pigmentation] : normal skin color and pigmentation [Oriented To Time, Place, And Person] : oriented to person, place, and time [de-identified] : b/l

## 2025-07-28 NOTE — DISCUSSION/SUMMARY
[FreeTextEntry1] : aggressive bp control.  refractory hypertension  continue coreg 25 q12/losartan 100/amlodipine 10 mg  hr 48 2023 and ? tired possible etiology as well continue coreg but possibly can change if severe symptoms in future.  continue hydralazine to 100 mg po tid.  continue atorvastatin 40 mg po qhs  JARDIANCE TOO EXPENSIVE. 2023  carotid: now LICA 70% as well, velocities similar: f/u dr. Delong  pulmonary htn probably due to DHF asymptomatic with elevated pressures,  continue spironolactone 50 mg po daily had dehydration in past so careful and call if symptoms. increase hctz from 12.5 to 25mg watch for dehydration patient educated on importance of hydration  send to dr. behuria for renal denervation trial blood work/ 4 months

## 2025-07-28 NOTE — CARDIOLOGY SUMMARY
[de-identified] : sinus bradycardia cwith nsst changes at 48 bpm  [de-identified] : 3/20/23: \par  Severe stenosis of the bilateral proximal internal carotid arteries due to atherosclerotic calcification. 70% B/L  [de-identified] : 2/27/23\par  1. Right: proximal ICA >70% stenosis.\par  2. Left: proximal ICA 50-69% stenosis.\par  3. Subclavian arteries: no significant atherosclerosis bilaterally.\par  4. Vertebral flow is antigrade bilaterally.\par  5. Moderate atherosclerosis b/l carotids.